# Patient Record
Sex: FEMALE | Race: WHITE | NOT HISPANIC OR LATINO
[De-identification: names, ages, dates, MRNs, and addresses within clinical notes are randomized per-mention and may not be internally consistent; named-entity substitution may affect disease eponyms.]

---

## 2024-02-22 PROBLEM — Z00.00 ENCOUNTER FOR PREVENTIVE HEALTH EXAMINATION: Status: ACTIVE | Noted: 2024-02-22

## 2024-02-23 ENCOUNTER — APPOINTMENT (OUTPATIENT)
Dept: DERMATOLOGY | Facility: CLINIC | Age: 54
End: 2024-02-23

## 2024-05-01 ENCOUNTER — APPOINTMENT (OUTPATIENT)
Dept: DERMATOLOGY | Facility: CLINIC | Age: 54
End: 2024-05-01
Payer: COMMERCIAL

## 2024-05-01 ENCOUNTER — NON-APPOINTMENT (OUTPATIENT)
Age: 54
End: 2024-05-01

## 2024-05-01 DIAGNOSIS — D22.30 MELANOCYTIC NEVI OF UNSPECIFIED PART OF FACE: ICD-10-CM

## 2024-05-01 DIAGNOSIS — L81.4 OTHER MELANIN HYPERPIGMENTATION: ICD-10-CM

## 2024-05-01 DIAGNOSIS — L71.9 ROSACEA, UNSPECIFIED: ICD-10-CM

## 2024-05-01 PROCEDURE — 99204 OFFICE O/P NEW MOD 45 MIN: CPT

## 2024-05-01 RX ORDER — METRONIDAZOLE 7.5 MG/G
0.75 GEL TOPICAL
Qty: 1 | Refills: 3 | Status: ACTIVE | COMMUNITY
Start: 2024-05-01 | End: 1900-01-01

## 2024-05-01 NOTE — HISTORY OF PRESENT ILLNESS
[de-identified] : Pt. for check of face Present for > 10 years; ? more visible recently No treatments attempted  also:  brown spot R cheek, Hx IPL treatment in distant past

## 2024-05-01 NOTE — ASSESSMENT
[FreeTextEntry1] : Chin:  benign dermal nevus, but being highlighted by surrounding redness, rosacea type changes   Therapeutic options and their risks and benefits; along with multiple diagnostic possibilities were discussed at length; risks and benefits of further study were discussed;  metrogel BID - decrease to qd for maintenance  also discussed Clinique Redness products  Facial lentigines Risks and benefits of Intense Pulsed Light Therapy were discussed including swelling, discoloration, lack of response, and need for multiple treatments;  Did well with prior tx in 2015  Tx full face @ [facility];

## 2024-05-01 NOTE — PHYSICAL EXAM
[FreeTextEntry3] : Face:  + lentigines and solar damage are present in sun exposed areas;  prominent lentigo R lower cheek  chin:  + tan dermal regular papule L of midline, with surrounding erythema, papules;  some similar changes on medial cheeks

## 2024-09-04 ENCOUNTER — APPOINTMENT (OUTPATIENT)
Dept: BREAST CENTER | Facility: CLINIC | Age: 54
End: 2024-09-04

## 2024-09-04 VITALS
HEART RATE: 60 BPM | RESPIRATION RATE: 16 BRPM | SYSTOLIC BLOOD PRESSURE: 117 MMHG | HEIGHT: 64 IN | BODY MASS INDEX: 21.51 KG/M2 | WEIGHT: 126 LBS | DIASTOLIC BLOOD PRESSURE: 74 MMHG

## 2024-09-04 DIAGNOSIS — N64.4 MASTODYNIA: ICD-10-CM

## 2024-09-04 DIAGNOSIS — Z12.31 ENCOUNTER FOR SCREENING MAMMOGRAM FOR MALIGNANT NEOPLASM OF BREAST: ICD-10-CM

## 2024-09-04 PROCEDURE — 99203 OFFICE O/P NEW LOW 30 MIN: CPT | Mod: 25

## 2024-09-04 PROCEDURE — 76641 ULTRASOUND BREAST COMPLETE: CPT | Mod: LT

## 2024-09-14 PROBLEM — N64.4 BREAST PAIN: Status: ACTIVE | Noted: 2024-09-14

## 2024-09-14 PROBLEM — Z12.31 ENCOUNTER FOR SCREENING MAMMOGRAM FOR BREAST CANCER: Status: ACTIVE | Noted: 2024-09-14 | Resolved: 2024-09-28

## 2024-09-14 NOTE — HISTORY OF PRESENT ILLNESS
[FreeTextEntry1] : Patient presents left breast pain following with a burning sensation for 3 weeks.   Denies palpable mass, redness on the skin, nipple discharge, trauma on the area.  Paternal grandmother at age 60 had breast cancer.  Last mammogram performed 2 years ago at [facility].

## 2024-09-14 NOTE — PHYSICAL EXAM
[Normocephalic] : normocephalic [Sclera nonicteric] : sclera nonicteric [No Supraclavicular Adenopathy] : no supraclavicular adenopathy [Symmetrical] : symmetrical [No dominant masses] : no dominant masses left breast [No Nipple Retraction] : no left nipple retraction [No Nipple Discharge] : no left nipple discharge [No Axillary Lymphadenopathy] : no left axillary lymphadenopathy [No Edema] : no edema [No Rashes] : no rashes [No Ulceration] : no ulceration

## 2024-09-14 NOTE — ASSESSMENT
[FreeTextEntry1] : History for left breast pain  No developing mass noted clinically  Patient advised routine screening mammogram  Follow-up 3 months  A total of 30 minutes was spent in consultation evaluation review

## 2024-09-14 NOTE — HISTORY OF PRESENT ILLNESS
[FreeTextEntry1] : Patient presents left breast pain following with a burning sensation for 3 weeks.   Denies palpable mass, redness on the skin, nipple discharge, trauma on the area.  Paternal grandmother at age 60 had breast cancer.  Last mammogram performed 2 years ago at [facility].

## 2024-09-14 NOTE — PROCEDURE
[FreeTextEntry3] : Ultrasound left breast  Patient reports left breast pain  Four-quadrant survey the left breast demonstrates no developing mass  There is no suspicious lymphadenopathy  BI-RADS 2

## 2024-09-23 ENCOUNTER — APPOINTMENT (OUTPATIENT)
Dept: BREAST CENTER | Facility: CLINIC | Age: 54
End: 2024-09-23